# Patient Record
Sex: MALE | Race: OTHER | URBAN - METROPOLITAN AREA
[De-identification: names, ages, dates, MRNs, and addresses within clinical notes are randomized per-mention and may not be internally consistent; named-entity substitution may affect disease eponyms.]

---

## 2020-01-18 ENCOUNTER — INPATIENT (INPATIENT)
Facility: HOSPITAL | Age: 50
LOS: 2 days | Discharge: HOME | End: 2020-01-21
Attending: INTERNAL MEDICINE | Admitting: INTERNAL MEDICINE
Payer: COMMERCIAL

## 2020-01-18 VITALS
WEIGHT: 190.92 LBS | RESPIRATION RATE: 20 BRPM | TEMPERATURE: 99 F | OXYGEN SATURATION: 99 % | SYSTOLIC BLOOD PRESSURE: 128 MMHG | DIASTOLIC BLOOD PRESSURE: 81 MMHG | HEIGHT: 68 IN | HEART RATE: 102 BPM

## 2020-01-18 LAB
ANION GAP SERPL CALC-SCNC: 12 MMOL/L — SIGNIFICANT CHANGE UP (ref 7–14)
BASOPHILS # BLD AUTO: 0.07 K/UL — SIGNIFICANT CHANGE UP (ref 0–0.2)
BASOPHILS NFR BLD AUTO: 0.5 % — SIGNIFICANT CHANGE UP (ref 0–1)
BUN SERPL-MCNC: 17 MG/DL — SIGNIFICANT CHANGE UP (ref 10–20)
CALCIUM SERPL-MCNC: 9.6 MG/DL — SIGNIFICANT CHANGE UP (ref 8.5–10.1)
CHLORIDE SERPL-SCNC: 102 MMOL/L — SIGNIFICANT CHANGE UP (ref 98–110)
CO2 SERPL-SCNC: 24 MMOL/L — SIGNIFICANT CHANGE UP (ref 17–32)
CREAT SERPL-MCNC: 0.9 MG/DL — SIGNIFICANT CHANGE UP (ref 0.7–1.5)
EOSINOPHIL # BLD AUTO: 0.21 K/UL — SIGNIFICANT CHANGE UP (ref 0–0.7)
EOSINOPHIL NFR BLD AUTO: 1.6 % — SIGNIFICANT CHANGE UP (ref 0–8)
GLUCOSE SERPL-MCNC: 98 MG/DL — SIGNIFICANT CHANGE UP (ref 70–99)
HCT VFR BLD CALC: 41.7 % — LOW (ref 42–52)
HGB BLD-MCNC: 14.4 G/DL — SIGNIFICANT CHANGE UP (ref 14–18)
IMM GRANULOCYTES NFR BLD AUTO: 0.4 % — HIGH (ref 0.1–0.3)
LACTATE SERPL-SCNC: 0.6 MMOL/L — LOW (ref 0.7–2)
LYMPHOCYTES # BLD AUTO: 25.5 % — SIGNIFICANT CHANGE UP (ref 20.5–51.1)
LYMPHOCYTES # BLD AUTO: 3.31 K/UL — SIGNIFICANT CHANGE UP (ref 1.2–3.4)
MCHC RBC-ENTMCNC: 32.1 PG — HIGH (ref 27–31)
MCHC RBC-ENTMCNC: 34.5 G/DL — SIGNIFICANT CHANGE UP (ref 32–37)
MCV RBC AUTO: 93.1 FL — SIGNIFICANT CHANGE UP (ref 80–94)
MONOCYTES # BLD AUTO: 0.99 K/UL — HIGH (ref 0.1–0.6)
MONOCYTES NFR BLD AUTO: 7.6 % — SIGNIFICANT CHANGE UP (ref 1.7–9.3)
NEUTROPHILS # BLD AUTO: 8.36 K/UL — HIGH (ref 1.4–6.5)
NEUTROPHILS NFR BLD AUTO: 64.4 % — SIGNIFICANT CHANGE UP (ref 42.2–75.2)
NRBC # BLD: 0 /100 WBCS — SIGNIFICANT CHANGE UP (ref 0–0)
PLATELET # BLD AUTO: 229 K/UL — SIGNIFICANT CHANGE UP (ref 130–400)
POTASSIUM SERPL-MCNC: 3.9 MMOL/L — SIGNIFICANT CHANGE UP (ref 3.5–5)
POTASSIUM SERPL-SCNC: 3.9 MMOL/L — SIGNIFICANT CHANGE UP (ref 3.5–5)
RBC # BLD: 4.48 M/UL — LOW (ref 4.7–6.1)
RBC # FLD: 12.2 % — SIGNIFICANT CHANGE UP (ref 11.5–14.5)
SODIUM SERPL-SCNC: 138 MMOL/L — SIGNIFICANT CHANGE UP (ref 135–146)
WBC # BLD: 12.99 K/UL — HIGH (ref 4.8–10.8)
WBC # FLD AUTO: 12.99 K/UL — HIGH (ref 4.8–10.8)

## 2020-01-18 PROCEDURE — 99285 EMERGENCY DEPT VISIT HI MDM: CPT

## 2020-01-18 RX ORDER — SODIUM CHLORIDE 9 MG/ML
1000 INJECTION INTRAMUSCULAR; INTRAVENOUS; SUBCUTANEOUS ONCE
Refills: 0 | Status: COMPLETED | OUTPATIENT
Start: 2020-01-18 | End: 2020-01-18

## 2020-01-18 RX ADMIN — Medication 100 MILLIGRAM(S): at 22:03

## 2020-01-18 RX ADMIN — SODIUM CHLORIDE 1000 MILLILITER(S): 9 INJECTION INTRAMUSCULAR; INTRAVENOUS; SUBCUTANEOUS at 22:04

## 2020-01-18 NOTE — ED ADULT NURSE NOTE - OBJECTIVE STATEMENT
pt states that he noticed redness and swelling to right lower leg x 2days, pt reports fever at home, denies n/v/d, aches

## 2020-01-19 DIAGNOSIS — R09.89 OTHER SPECIFIED SYMPTOMS AND SIGNS INVOLVING THE CIRCULATORY AND RESPIRATORY SYSTEMS: ICD-10-CM

## 2020-01-19 LAB
ANION GAP SERPL CALC-SCNC: 13 MMOL/L — SIGNIFICANT CHANGE UP (ref 7–14)
BASOPHILS # BLD AUTO: 0.06 K/UL — SIGNIFICANT CHANGE UP (ref 0–0.2)
BASOPHILS NFR BLD AUTO: 0.5 % — SIGNIFICANT CHANGE UP (ref 0–1)
BUN SERPL-MCNC: 12 MG/DL — SIGNIFICANT CHANGE UP (ref 10–20)
CALCIUM SERPL-MCNC: 9.3 MG/DL — SIGNIFICANT CHANGE UP (ref 8.5–10.1)
CHLORIDE SERPL-SCNC: 104 MMOL/L — SIGNIFICANT CHANGE UP (ref 98–110)
CO2 SERPL-SCNC: 23 MMOL/L — SIGNIFICANT CHANGE UP (ref 17–32)
CREAT SERPL-MCNC: 0.9 MG/DL — SIGNIFICANT CHANGE UP (ref 0.7–1.5)
EOSINOPHIL # BLD AUTO: 0.23 K/UL — SIGNIFICANT CHANGE UP (ref 0–0.7)
EOSINOPHIL NFR BLD AUTO: 1.9 % — SIGNIFICANT CHANGE UP (ref 0–8)
GLUCOSE SERPL-MCNC: 105 MG/DL — HIGH (ref 70–99)
HCT VFR BLD CALC: 39.7 % — LOW (ref 42–52)
HGB BLD-MCNC: 13.5 G/DL — LOW (ref 14–18)
IMM GRANULOCYTES NFR BLD AUTO: 0.4 % — HIGH (ref 0.1–0.3)
LYMPHOCYTES # BLD AUTO: 19.9 % — LOW (ref 20.5–51.1)
LYMPHOCYTES # BLD AUTO: 2.45 K/UL — SIGNIFICANT CHANGE UP (ref 1.2–3.4)
MCHC RBC-ENTMCNC: 31.5 PG — HIGH (ref 27–31)
MCHC RBC-ENTMCNC: 34 G/DL — SIGNIFICANT CHANGE UP (ref 32–37)
MCV RBC AUTO: 92.5 FL — SIGNIFICANT CHANGE UP (ref 80–94)
MONOCYTES # BLD AUTO: 1 K/UL — HIGH (ref 0.1–0.6)
MONOCYTES NFR BLD AUTO: 8.1 % — SIGNIFICANT CHANGE UP (ref 1.7–9.3)
MRSA PCR RESULT.: NEGATIVE — SIGNIFICANT CHANGE UP
NEUTROPHILS # BLD AUTO: 8.52 K/UL — HIGH (ref 1.4–6.5)
NEUTROPHILS NFR BLD AUTO: 69.2 % — SIGNIFICANT CHANGE UP (ref 42.2–75.2)
NRBC # BLD: 0 /100 WBCS — SIGNIFICANT CHANGE UP (ref 0–0)
PLATELET # BLD AUTO: 208 K/UL — SIGNIFICANT CHANGE UP (ref 130–400)
POTASSIUM SERPL-MCNC: 4.1 MMOL/L — SIGNIFICANT CHANGE UP (ref 3.5–5)
POTASSIUM SERPL-SCNC: 4.1 MMOL/L — SIGNIFICANT CHANGE UP (ref 3.5–5)
RBC # BLD: 4.29 M/UL — LOW (ref 4.7–6.1)
RBC # FLD: 12.5 % — SIGNIFICANT CHANGE UP (ref 11.5–14.5)
SODIUM SERPL-SCNC: 140 MMOL/L — SIGNIFICANT CHANGE UP (ref 135–146)
WBC # BLD: 12.31 K/UL — HIGH (ref 4.8–10.8)
WBC # FLD AUTO: 12.31 K/UL — HIGH (ref 4.8–10.8)

## 2020-01-19 PROCEDURE — 76882 US LMTD JT/FCL EVL NVASC XTR: CPT | Mod: 26,RT

## 2020-01-19 PROCEDURE — 93971 EXTREMITY STUDY: CPT | Mod: 26,RT

## 2020-01-19 RX ORDER — CEFTRIAXONE 500 MG/1
1000 INJECTION, POWDER, FOR SOLUTION INTRAMUSCULAR; INTRAVENOUS EVERY 12 HOURS
Refills: 0 | Status: DISCONTINUED | OUTPATIENT
Start: 2020-01-19 | End: 2020-01-20

## 2020-01-19 RX ORDER — ACETAMINOPHEN 500 MG
650 TABLET ORAL EVERY 6 HOURS
Refills: 0 | Status: DISCONTINUED | OUTPATIENT
Start: 2020-01-19 | End: 2020-01-21

## 2020-01-19 RX ORDER — CHLORHEXIDINE GLUCONATE 213 G/1000ML
1 SOLUTION TOPICAL
Refills: 0 | Status: DISCONTINUED | OUTPATIENT
Start: 2020-01-19 | End: 2020-01-21

## 2020-01-19 RX ORDER — VANCOMYCIN HCL 1 G
1000 VIAL (EA) INTRAVENOUS EVERY 12 HOURS
Refills: 0 | Status: DISCONTINUED | OUTPATIENT
Start: 2020-01-19 | End: 2020-01-21

## 2020-01-19 RX ORDER — ENOXAPARIN SODIUM 100 MG/ML
40 INJECTION SUBCUTANEOUS DAILY
Refills: 0 | Status: DISCONTINUED | OUTPATIENT
Start: 2020-01-19 | End: 2020-01-21

## 2020-01-19 RX ADMIN — CEFTRIAXONE 100 MILLIGRAM(S): 500 INJECTION, POWDER, FOR SOLUTION INTRAMUSCULAR; INTRAVENOUS at 17:44

## 2020-01-19 RX ADMIN — Medication 250 MILLIGRAM(S): at 17:44

## 2020-01-19 RX ADMIN — Medication 250 MILLIGRAM(S): at 05:31

## 2020-01-19 RX ADMIN — ENOXAPARIN SODIUM 40 MILLIGRAM(S): 100 INJECTION SUBCUTANEOUS at 11:59

## 2020-01-19 RX ADMIN — CEFTRIAXONE 100 MILLIGRAM(S): 500 INJECTION, POWDER, FOR SOLUTION INTRAMUSCULAR; INTRAVENOUS at 05:31

## 2020-01-19 NOTE — H&P ADULT - HISTORY OF PRESENT ILLNESS
50y/o M with no PMH presents with swelling in right lower leg for 4d. Started with some discomfort on R lateral leg, which persisted then area became erythematous and swollen. Went to PMD Dr. Peck who prescribed keflex 500 q6h which he took for 2d. The erythema/swelling continued to worsen and spread to ankle/foot. He also developed some pain in R thigh/groin. He recalled his PCP who told him to come to ED. Endorses fever (measured T105 at home?). Otherwise     In ED: T99.1, HR 77, /69, RR20, SpO2 99%. Labs showed WBC 13 w/o neutrophilic predominance, labs otherwise unremarkable. Pt given clinda, doxy and IVF 1L. 50y/o M with no PMH presents with swelling in right lower leg for 4d. Started with some discomfort on R lateral leg, which persisted then area became erythematous and swollen. Went to PMD Dr. Peck who prescribed keflex 500 q6h which he took for 2d. The erythema/swelling continued to worsen and his ankle/foot became swollen. He also developed some pain in R thigh/groin but no streaking redness along reg. He recalled his PCP who told him to come to ED. Endorses fever, diaphoresis/rigors last night (measured T103 at home). Otherwise denies CP, SOB, palpitations, lightheadness, abd pain, N/V/D/C. No recent flights but works as a .    In ED: T99.1, HR 77, /69, RR20, SpO2 99%. Labs showed WBC 13 w/o neutrophilic predominance, labs otherwise unremarkable. Pt given clinda, doxy and IVF 1L.

## 2020-01-19 NOTE — H&P ADULT - ATTENDING COMMENTS
Patient seen and examined at bedside, agree with above. RLE cellulitis: IV antibiotics as ordered. Blood cultures and sensitivity. Venous duplex. RLE sonogram R/O abscess. ID consult Dr. Patel. Prognosis guarded.

## 2020-01-19 NOTE — ED PROVIDER NOTE - OBJECTIVE STATEMENT
Patient states that on Wednesday started having discomfort on Rt lateral leg, at that time, which continued, next day noted small area of redness with swelling, so went to the PMD, who gave him keflex 500mgs 4 times a day, patient took 8 doses of keflex, stated that redness and swelling continued to get worse, and also noted swelling of the leg, around the ankle area and the foot, started feeling pain in the Rt proximal thigh and groin area, called Dr. Peck, was told to go to ED if gets worse, so had come to ED. Patient also states that yesterday had fever, checked temp and it was 105F, today did not feel the fever, denies any other symptoms.

## 2020-01-19 NOTE — H&P ADULT - NSHPPHYSICALEXAM_GEN_ALL_CORE
PHYSICAL EXAM:  GENERAL: NAD, well-developed  HEENT:  Atraumatic, Normocephalic; EOMI, PERRLA, conjunctiva pink, sclera white, Supple, No JVD, thyromegally or LYAD appreciated  CHEST/LUNG: Clear to auscultation bilaterally; No wheeze, ronchi or rales  HEART: Regular rate and rhythm; No murmurs, rubs, or gallops  ABDOMEN: Soft, Nontender, Nondistended; Bowel sounds present  EXTREMITIES:  2+ Peripheral Pulses, No peripheral pitting edema  PSYCH: AAOx3  NEUROLOGY: non-focal  SKIN: No rashes/lesions appreciated PHYSICAL EXAM:  GENERAL: NAD, well-developed M sleeping comfortably   HEENT:  Atraumatic, Normocephalic; EOMI, PERRLA, conjunctiva pink, sclera white, Supple, No LYAD in cervical chain appreciated  CHEST/LUNG: Clear to auscultation bilaterally; No wheeze  HEART: Regular rate and rhythm; No murmurs  ABDOMEN: Soft, Nontender, Nondistended; Bowel sounds present  EXTREMITIES:  R lower leg lateral 8" area of erythema, with small punctate center, unable to express pus, indurated, no discrete area of fluctuance, ankle/leg warm and swollen with intact pulses/light sensation. No streaking, no appreciable lymphadenopathy in femoral area, no calf tenderness bilat  PSYCH: AAOx3  NEUROLOGY: non-focal  SKIN: as above

## 2020-01-19 NOTE — H&P ADULT - ASSESSMENT
48y/o M with no PMH presents with purulent R leg cellulitis.     #R leg cellulitis with some purulence  - WBC 13 on admit, fever at home not in hospital  - f/u BCx  - f/u duplex  - c/w     #MISC  - DVT ppx: lovenox  - GI ppx: not indicated  - Activity: as tolerated  - Diet: regular 50y/o M with no PMH presents with purulent R leg cellulitis.     #R leg cellulitis with some purulence; very unlikely underlying DVT  - WBC 13 on admit, fever at home not in hospital; monitor area for change  - f/u BCx  - f/u duplex   - f/u US of the area (looking for abscess, will need I&D if there is discrete collection)  - start vanc, ceftriaxone   - consider ID if not improving    #MISC  - DVT ppx: lovenox  - GI ppx: not indicated  - Activity: as tolerated  - Diet: regular

## 2020-01-19 NOTE — ED PROVIDER NOTE - PHYSICAL EXAMINATION
On side to side comparison with LLE--   RLE has soft tissue swelling of the leg/around the ankles/dorsum of the foot, on Rt lateral leg has small punctum with surrounding swelling/induration with tenderness, no fluctuation noted, no crepitus, full ROM of the knee/ankle/foot noted, no lymphangitis, no inguinal Lymphadenopathy. RLE is distally NVI.

## 2020-01-19 NOTE — H&P ADULT - NSHPLABSRESULTS_GEN_ALL_CORE
13.5   12.31 )-----------( 208      ( 19 Jan 2020 06:15 )             39.7   01-19    140  |  104  |  12  ----------------------------<  105<H>  4.1   |  23  |  0.9    Ca    9.3      19 Jan 2020 06:15

## 2020-01-20 LAB
ANION GAP SERPL CALC-SCNC: 11 MMOL/L — SIGNIFICANT CHANGE UP (ref 7–14)
APTT BLD: 33.6 SEC — SIGNIFICANT CHANGE UP (ref 27–39.2)
BASOPHILS # BLD AUTO: 0.06 K/UL — SIGNIFICANT CHANGE UP (ref 0–0.2)
BASOPHILS NFR BLD AUTO: 0.5 % — SIGNIFICANT CHANGE UP (ref 0–1)
BUN SERPL-MCNC: 11 MG/DL — SIGNIFICANT CHANGE UP (ref 10–20)
CALCIUM SERPL-MCNC: 9.3 MG/DL — SIGNIFICANT CHANGE UP (ref 8.5–10.1)
CHLORIDE SERPL-SCNC: 103 MMOL/L — SIGNIFICANT CHANGE UP (ref 98–110)
CO2 SERPL-SCNC: 25 MMOL/L — SIGNIFICANT CHANGE UP (ref 17–32)
CREAT SERPL-MCNC: 0.8 MG/DL — SIGNIFICANT CHANGE UP (ref 0.7–1.5)
EOSINOPHIL # BLD AUTO: 0.24 K/UL — SIGNIFICANT CHANGE UP (ref 0–0.7)
EOSINOPHIL NFR BLD AUTO: 2.1 % — SIGNIFICANT CHANGE UP (ref 0–8)
GLUCOSE SERPL-MCNC: 109 MG/DL — HIGH (ref 70–99)
HCT VFR BLD CALC: 40.7 % — LOW (ref 42–52)
HGB BLD-MCNC: 14 G/DL — SIGNIFICANT CHANGE UP (ref 14–18)
IMM GRANULOCYTES NFR BLD AUTO: 0.4 % — HIGH (ref 0.1–0.3)
INR BLD: 1.1 RATIO — SIGNIFICANT CHANGE UP (ref 0.65–1.3)
LYMPHOCYTES # BLD AUTO: 2.52 K/UL — SIGNIFICANT CHANGE UP (ref 1.2–3.4)
LYMPHOCYTES # BLD AUTO: 22.5 % — SIGNIFICANT CHANGE UP (ref 20.5–51.1)
MAGNESIUM SERPL-MCNC: 2 MG/DL — SIGNIFICANT CHANGE UP (ref 1.8–2.4)
MCHC RBC-ENTMCNC: 31.7 PG — HIGH (ref 27–31)
MCHC RBC-ENTMCNC: 34.4 G/DL — SIGNIFICANT CHANGE UP (ref 32–37)
MCV RBC AUTO: 92.1 FL — SIGNIFICANT CHANGE UP (ref 80–94)
MONOCYTES # BLD AUTO: 0.8 K/UL — HIGH (ref 0.1–0.6)
MONOCYTES NFR BLD AUTO: 7.1 % — SIGNIFICANT CHANGE UP (ref 1.7–9.3)
NEUTROPHILS # BLD AUTO: 7.55 K/UL — HIGH (ref 1.4–6.5)
NEUTROPHILS NFR BLD AUTO: 67.4 % — SIGNIFICANT CHANGE UP (ref 42.2–75.2)
NRBC # BLD: 0 /100 WBCS — SIGNIFICANT CHANGE UP (ref 0–0)
PLATELET # BLD AUTO: 227 K/UL — SIGNIFICANT CHANGE UP (ref 130–400)
POTASSIUM SERPL-MCNC: 3.6 MMOL/L — SIGNIFICANT CHANGE UP (ref 3.5–5)
POTASSIUM SERPL-SCNC: 3.6 MMOL/L — SIGNIFICANT CHANGE UP (ref 3.5–5)
PROTHROM AB SERPL-ACNC: 12.6 SEC — SIGNIFICANT CHANGE UP (ref 9.95–12.87)
RBC # BLD: 4.42 M/UL — LOW (ref 4.7–6.1)
RBC # FLD: 12.2 % — SIGNIFICANT CHANGE UP (ref 11.5–14.5)
SODIUM SERPL-SCNC: 139 MMOL/L — SIGNIFICANT CHANGE UP (ref 135–146)
WBC # BLD: 11.22 K/UL — HIGH (ref 4.8–10.8)
WBC # FLD AUTO: 11.22 K/UL — HIGH (ref 4.8–10.8)

## 2020-01-20 PROCEDURE — 11043 DBRDMT MUSC&/FSCA 1ST 20/<: CPT

## 2020-01-20 PROCEDURE — 99231 SBSQ HOSP IP/OBS SF/LOW 25: CPT | Mod: 25

## 2020-01-20 PROCEDURE — 11046 DBRDMT MUSC&/FSCA EA ADDL: CPT

## 2020-01-20 RX ORDER — MORPHINE SULFATE 50 MG/1
2 CAPSULE, EXTENDED RELEASE ORAL EVERY 6 HOURS
Refills: 0 | Status: DISCONTINUED | OUTPATIENT
Start: 2020-01-20 | End: 2020-01-20

## 2020-01-20 RX ORDER — MORPHINE SULFATE 50 MG/1
2 CAPSULE, EXTENDED RELEASE ORAL ONCE
Refills: 0 | Status: DISCONTINUED | OUTPATIENT
Start: 2020-01-20 | End: 2020-01-20

## 2020-01-20 RX ADMIN — Medication 250 MILLIGRAM(S): at 05:29

## 2020-01-20 RX ADMIN — Medication 250 MILLIGRAM(S): at 17:47

## 2020-01-20 RX ADMIN — MORPHINE SULFATE 2 MILLIGRAM(S): 50 CAPSULE, EXTENDED RELEASE ORAL at 18:05

## 2020-01-20 RX ADMIN — MORPHINE SULFATE 2 MILLIGRAM(S): 50 CAPSULE, EXTENDED RELEASE ORAL at 14:08

## 2020-01-20 RX ADMIN — MORPHINE SULFATE 2 MILLIGRAM(S): 50 CAPSULE, EXTENDED RELEASE ORAL at 18:20

## 2020-01-20 RX ADMIN — ENOXAPARIN SODIUM 40 MILLIGRAM(S): 100 INJECTION SUBCUTANEOUS at 12:19

## 2020-01-20 RX ADMIN — CEFTRIAXONE 100 MILLIGRAM(S): 500 INJECTION, POWDER, FOR SOLUTION INTRAMUSCULAR; INTRAVENOUS at 05:30

## 2020-01-20 NOTE — CONSULT NOTE ADULT - SUBJECTIVE AND OBJECTIVE BOX
pt with infected right lower lateral leg--. on po/ iv abx    pe; right lower lateral leg--> 10x5cm erythema and tenderness with central tenderness

## 2020-01-20 NOTE — BRIEF OPERATIVE NOTE - NSICDXBRIEFPROCEDURE_GEN_ALL_CORE_FT
PROCEDURES:  Selective debridement 20-Jan-2020 17:10:13 excisoinal debridement right lower leg Sesar Escobar

## 2020-01-20 NOTE — PROGRESS NOTE ADULT - ASSESSMENT
1. RLE cellulitis: IV antibiotics as ordered. Elevate extremity. Warm compresses. ID consult. Follow up blood cultures and sensitivity. Transition to PO antibiotics when deemed appropriate by ID. DVT prophylaxis

## 2020-01-20 NOTE — PROGRESS NOTE ADULT - SUBJECTIVE AND OBJECTIVE BOX
JOVANNY STONER 49y Male  MRN#: [id number]   CODE STATUS: FULL      SUBJECTIVE  Patient is a 49y old Male who presents with a chief complaint of right leg swelling (20 Jan 2020 10:10)    Currently admitted to medicine with the primary diagnosis of RLE celluliltis    Today is hospital day 1d,   OVERNIGHT EVENTS: none, patient does not have any LE pain , feels better, ambulating well.    This morning he is laying in bed comfortably .     Urinating and stooling appropriately.    Present Today:           Bodren Catheter (x)No/ ()Yes?   Indication:             Central Line (x)No/ ()Yes?   Indication:          IV Fluids (x)No/ ()Yes? Type:  Rate:  Indication:    OBJECTIVE  PAST MEDICAL & SURGICAL HISTORY  No pertinent past medical history  No significant past surgical history    ALLERGIES:  No Known Allergies    HOME MEDICATIONS:  Home Medications:    MEDICATIONS:  STANDING MEDICATIONS  chlorhexidine 4% Liquid 1 Application(s) Topical <User Schedule>  enoxaparin Injectable 40 milliGRAM(s) SubCutaneous daily  vancomycin  IVPB 1000 milliGRAM(s) IV Intermittent every 12 hours    PRN MEDICATIONS  acetaminophen   Tablet .. 650 milliGRAM(s) Oral every 6 hours PRN      VITAL SIGNS: Last 24 Hours  T(C): 35.9 (20 Jan 2020 05:55), Max: 36.7 (19 Jan 2020 21:06)  T(F): 96.7 (20 Jan 2020 05:55), Max: 98.1 (19 Jan 2020 21:06)  HR: 68 (20 Jan 2020 05:55) (68 - 88)  BP: 111/68 (20 Jan 2020 05:55) (111/68 - 125/77)  RR: 18 (20 Jan 2020 05:55) (18 - 18)  SpO2: 99% (19 Jan 2020 13:58) (99% - 99%)    LABS:                        14.0   11.22 )-----------( 227      ( 20 Jan 2020 07:39 )             40.7     01-20    139  |  103  |  11  ----------------------------<  109<H>  3.6   |  25  |  0.8    Ca    9.3      20 Jan 2020 07:39  Mg     2.0     01-20      PT/INR - ( 20 Jan 2020 07:39 )   PT: 12.60 sec;   INR: 1.10 ratio         PTT - ( 20 Jan 2020 07:39 )  PTT:33.6 sec  Culture - Blood (collected 18 Jan 2020 21:24)  Source: .Blood Blood-Venous  Preliminary Report (20 Jan 2020 04:04):    No growth to date.    Culture - Blood (collected 18 Jan 2020 21:24)  Source: .Blood Blood-Venous  Preliminary Report (20 Jan 2020 04:03):    No growth to date.      RADIOLOGY:     VA Duplex Lower Ext Vein Scan, Right (01.19.20 @ 10:05) >  No evidence of deep venous thrombosis or superficial thrombophlebitis in the right lower extremity    US Extremity Nonvasc Limited, Right (01.19.20 @ 02:51) >  Subcutaneous edema.     No sonographic evidence of abscess or fluid collection.    ECHO:  none in this admission    PHYSICAL EXAM:    GENERAL: NAD, well-developed, AAOx3  HEENT:  Atraumatic, Normocephalic. EOMI, PERRLA, conjunctiva and sclera clear, No JVD  PULMONARY: Clear to auscultation bilaterally; No wheeze  CARDIOVASCULAR: Regular rate and rhythm; No murmurs, rubs, or gallops  GASTROINTESTINAL: Soft, Nontender, Nondistended; Bowel sounds present  MUSCULOSKELETAL:  2+ Peripheral Pulses, No clubbing, cyanosis  EXTREMITIES: right lower lateral leg--> 10x5cm erythema and tenderness with central tenderness  NEUROLOGY: non-focal  SKIN: No rashes or lesions    ASSESSMENT & PLAN  50y/o M with no PMH presents with  R leg cellulitis.     #R leg cellulitis with some purulence; very unlikely underlying DVT  - WBC 12.9>>11.22  afebrile during the hospital stage so far  -  BCx 1/18: NG, MRSA nares negative  - VA duplex -ve DVT  - US of rLE: subq edema , no abcess /fluid collection but fluctuance present on examination  - As per ID , c/w vancomycin , d/c rocephin  - burn consulted , I&D at bedside today    #MISC  - DVT ppx: lovenox  - GI ppx: not indicated  - Activity: as tolerated  - Diet: regular  - CODE: FULL  - Disposition: from home  - Pending: Burn I&D

## 2020-01-20 NOTE — CONSULT NOTE ADULT - SUBJECTIVE AND OBJECTIVE BOX
JOVANNY STONER  49y, Male  Allergy: No Known Allergies      CHIEF COMPLAINT: right leg swelling (20 Jan 2020 07:34)      HPI:  48y/o M with no PMH presents with swelling in right lower leg for 4d. Started with some discomfort on R lateral leg, which persisted then area became erythematous and swollen. Went to PMD Dr. Peck who prescribed keflex 500 q6h which he took for 2d. The erythema/swelling continued to worsen and his ankle/foot became swollen. He also developed some pain in R thigh/groin but no streaking redness along reg. He recalled his PCP who told him to come to ED. Endorses fever, diaphoresis/rigors last night (measured T103 at home). Otherwise denies CP, SOB, palpitations, lightheadness, abd pain, N/V/D/C. No recent flights but works as a .    In ED: T99.1, HR 77, /69, RR20, SpO2 99%. Labs showed WBC 13 w/o neutrophilic predominance, labs otherwise unremarkable. Pt given clinda, doxy and IVF 1L. (19 Jan 2020 01:43)      PAST MEDICAL & SURGICAL HISTORY:  No pertinent past medical history  No significant past surgical history      FAMILY HISTORY  No pertinent family history in first degree relatives      SOCIAL HISTORY  Social History (marital status, living situation, occupation, tobacco use, alcohol and drug use, and sexual history): social EtOH, no drug/cig.      ROS  General: Denies rigors, nightsweats  HEENT: Denies headache, rhinorrhea, sore throat, eye pain  CV: Denies CP, palpitations  PULM: Denies wheezing, hemoptysis  GI: Denies hematemesis, hematochezia, melena  : Denies discharge, hematuria  MSK: Denies arthralgias, myalgias  SKIN: as noted above   NEURO: Denies paresthesias, weakness  PSYCH: Denies depression, anxiety    VITALS:  T(F): 96.7, Max: 98.1 (01-19-20 @ 21:06)  HR: 68  BP: 111/68  RR: 18Vital Signs Last 24 Hrs  T(C): 35.9 (20 Jan 2020 05:55), Max: 36.7 (19 Jan 2020 21:06)  T(F): 96.7 (20 Jan 2020 05:55), Max: 98.1 (19 Jan 2020 21:06)  HR: 68 (20 Jan 2020 05:55) (68 - 88)  BP: 111/68 (20 Jan 2020 05:55) (111/68 - 125/77)  BP(mean): --  RR: 18 (20 Jan 2020 05:55) (18 - 18)  SpO2: 99% (19 Jan 2020 13:58) (99% - 99%)    PHYSICAL EXAM:  Gen: NAD, resting in bed  HEENT: Normocephalic, atraumatic  Neck: supple, no lymphadenopathy  CV: Regular rate & regular rhythm  Lungs: decreased BS at bases, no fremitus  Abdomen: Soft, BS present  Ext: Warm, well perfused, RLE erythema  Neuro: non focal, awake  Skin: no rash, no erythema  Lines: no phlebitis    TESTS & MEASUREMENTS:                        14.0   11.22 )-----------( 227      ( 20 Jan 2020 07:39 )             40.7     01-20    139  |  103  |  11  ----------------------------<  109<H>  3.6   |  25  |  0.8    Ca    9.3      20 Jan 2020 07:39  Mg     2.0     01-20      eGFR if Non African American: 105 mL/min/1.73M2 (01-20-20 @ 07:39)  eGFR if African American: 122 mL/min/1.73M2 (01-20-20 @ 07:39)          Culture - Blood (collected 01-18-20 @ 21:24)  Source: .Blood Blood-Venous  Preliminary Report (01-20-20 @ 04:04):    No growth to date.    Culture - Blood (collected 01-18-20 @ 21:24)  Source: .Blood Blood-Venous  Preliminary Report (01-20-20 @ 04:03):    No growth to date.        Lactate, Blood: 0.6 mmol/L (01-18-20 @ 21:24)      INFECTIOUS DISEASES TESTING  MRSA PCR Result.: Negative (01-19-20 @ 06:00)      RADIOLOGY & ADDITIONAL TESTS:  I have personally reviewed the last Chest xray  CXR      CT      CARDIOLOGY TESTING      MEDICATIONS  cefTRIAXone   IVPB 1000  chlorhexidine 4% Liquid 1  enoxaparin Injectable 40  vancomycin  IVPB 1000      ANTIBIOTICS:  cefTRIAXone   IVPB 1000 milliGRAM(s) IV Intermittent every 12 hours  vancomycin  IVPB 1000 milliGRAM(s) IV Intermittent every 12 hours      ALLERGIES:  No Known Allergies JOVANNY STONER  49y, Male  Allergy: No Known Allergies      CHIEF COMPLAINT: right leg swelling (20 Jan 2020 07:34)      HPI:  50y/o M with no PMH presents with swelling in right lower leg for 4d. Started with some discomfort on R lateral leg, which persisted then area became erythematous and swollen. Went to PMD Dr. Peck who prescribed keflex 500 q6h which he took for 2d. The erythema/swelling continued to worsen and his ankle/foot became swollen. He also developed some pain in R thigh/groin but no streaking redness along reg. He recalled his PCP who told him to come to ED. Endorses fever, diaphoresis/rigors last night (measured T103 at home). Otherwise denies CP, SOB, palpitations, lightheadness, abd pain, N/V/D/C. No recent flights but works as a .    In ED: T99.1, HR 77, /69, RR20, SpO2 99%. Labs showed WBC 13 w/o neutrophilic predominance, labs otherwise unremarkable. Pt given clinda, doxy and IVF 1L. (19 Jan 2020 01:43)      PAST MEDICAL & SURGICAL HISTORY:  No pertinent past medical history  No significant past surgical history      FAMILY HISTORY  No pertinent family history in first degree relatives      SOCIAL HISTORY  Social History (marital status, living situation, occupation, tobacco use, alcohol and drug use, and sexual history): social EtOH, no drug/cig.      ROS  General: Denies rigors, nightsweats  HEENT: Denies headache, rhinorrhea, sore throat, eye pain  CV: Denies CP, palpitations  PULM: Denies wheezing, hemoptysis  GI: Denies hematemesis, hematochezia, melena  : Denies discharge, hematuria  MSK: Denies arthralgias, myalgias  SKIN: as noted above   NEURO: Denies paresthesias, weakness  PSYCH: Denies depression, anxiety    VITALS:  T(F): 96.7, Max: 98.1 (01-19-20 @ 21:06)  HR: 68  BP: 111/68  RR: 18Vital Signs Last 24 Hrs  T(C): 35.9 (20 Jan 2020 05:55), Max: 36.7 (19 Jan 2020 21:06)  T(F): 96.7 (20 Jan 2020 05:55), Max: 98.1 (19 Jan 2020 21:06)  HR: 68 (20 Jan 2020 05:55) (68 - 88)  BP: 111/68 (20 Jan 2020 05:55) (111/68 - 125/77)  BP(mean): --  RR: 18 (20 Jan 2020 05:55) (18 - 18)  SpO2: 99% (19 Jan 2020 13:58) (99% - 99%)    PHYSICAL EXAM:  Gen: NAD, resting in bed  HEENT: Normocephalic, atraumatic  Neck: supple, no lymphadenopathy  CV: Regular rate & regular rhythm  Lungs: decreased BS at bases, no fremitus  Abdomen: Soft, BS present  Ext: Warm, well perfused, RLE erythema/fluctuance  Neuro: non focal, awake  Skin: no rash, no erythema  Lines: no phlebitis    TESTS & MEASUREMENTS:                        14.0   11.22 )-----------( 227      ( 20 Jan 2020 07:39 )             40.7     01-20    139  |  103  |  11  ----------------------------<  109<H>  3.6   |  25  |  0.8    Ca    9.3      20 Jan 2020 07:39  Mg     2.0     01-20      eGFR if Non African American: 105 mL/min/1.73M2 (01-20-20 @ 07:39)  eGFR if African American: 122 mL/min/1.73M2 (01-20-20 @ 07:39)          Culture - Blood (collected 01-18-20 @ 21:24)  Source: .Blood Blood-Venous  Preliminary Report (01-20-20 @ 04:04):    No growth to date.    Culture - Blood (collected 01-18-20 @ 21:24)  Source: .Blood Blood-Venous  Preliminary Report (01-20-20 @ 04:03):    No growth to date.        Lactate, Blood: 0.6 mmol/L (01-18-20 @ 21:24)      INFECTIOUS DISEASES TESTING  MRSA PCR Result.: Negative (01-19-20 @ 06:00)      RADIOLOGY & ADDITIONAL TESTS:  I have personally reviewed the last Chest xray  CXR      CT      CARDIOLOGY TESTING      MEDICATIONS  cefTRIAXone   IVPB 1000  chlorhexidine 4% Liquid 1  enoxaparin Injectable 40  vancomycin  IVPB 1000      ANTIBIOTICS:  cefTRIAXone   IVPB 1000 milliGRAM(s) IV Intermittent every 12 hours  vancomycin  IVPB 1000 milliGRAM(s) IV Intermittent every 12 hours      ALLERGIES:  No Known Allergies

## 2020-01-20 NOTE — PROGRESS NOTE ADULT - SUBJECTIVE AND OBJECTIVE BOX
JOVANNY STONER  49y  Male      Patient is a 49y old  Male who presents with a chief complaint of right leg swelling (19 Jan 2020 01:43)      INTERVAL HPI/OVERNIGHT EVENTS: Feeling better      REVIEW OF SYSTEMS:  CONSTITUTIONAL: No fever, weight loss, or fatigue  EYES: No eye pain, visual disturbances, or discharge  ENMT:  No difficulty hearing, tinnitus, vertigo; No sinus or throat pain  NECK: No pain or stiffness  BREASTS: No pain, masses, or nipple discharge  RESPIRATORY: No cough, wheezing, chills or hemoptysis; No shortness of breath  CARDIOVASCULAR: No chest pain, palpitations, dizziness, or leg swelling  GASTROINTESTINAL: No abdominal or epigastric pain. No nausea, vomiting, or hematemesis; No diarrhea or constipation. No melena or hematochezia.  GENITOURINARY: No dysuria, frequency, hematuria, or incontinence  NEUROLOGICAL: No headaches, memory loss, loss of strength, numbness, or tremors  SKIN: No itching, burning, rashes, or lesions   LYMPH NODES: No enlarged glands  ENDOCRINE: No heat or cold intolerance; No hair loss  MUSCULOSKELETAL: No joint pain or swelling; No muscle or back pain. RLE pain improved  PSYCHIATRIC: No depression, anxiety, mood swings, or difficulty sleeping  HEME/LYMPH: No easy bruising, or bleeding gums  ALLERY AND IMMUNOLOGIC: No hives or eczema    T(C): 35.9 (01-20-20 @ 05:55), Max: 36.7 (01-19-20 @ 21:06)  HR: 68 (01-20-20 @ 05:55) (68 - 88)  BP: 111/68 (01-20-20 @ 05:55) (111/68 - 125/77)  RR: 18 (01-20-20 @ 05:55) (18 - 18)  SpO2: 99% (01-19-20 @ 13:58) (98% - 99%)  Wt(kg): --Vital Signs Last 24 Hrs  T(C): 35.9 (20 Jan 2020 05:55), Max: 36.7 (19 Jan 2020 21:06)  T(F): 96.7 (20 Jan 2020 05:55), Max: 98.1 (19 Jan 2020 21:06)  HR: 68 (20 Jan 2020 05:55) (68 - 88)  BP: 111/68 (20 Jan 2020 05:55) (111/68 - 125/77)  BP(mean): --  RR: 18 (20 Jan 2020 05:55) (18 - 18)  SpO2: 99% (19 Jan 2020 13:58) (98% - 99%)    PHYSICAL EXAM:  GENERAL: NAD, well-groomed, well-developed  HEAD:  Atraumatic, Normocephalic  EYES: EOMI, PERRLA, conjunctiva and sclera clear  ENMT: No tonsillar erythema, exudates, or enlargement; Moist mucous membranes, Good dentition, No lesions  NECK: Supple, No JVD, Normal thyroid  NERVOUS SYSTEM:  Alert & Oriented X3, Good concentration; Motor Strength 5/5 B/L upper and lower extremities; DTRs 2+ intact and symmetric  CHEST/LUNG: Clear to percussion bilaterally; No rales, rhonchi, wheezing, or rubs  HEART: Regular rate and rhythm; No murmurs, rubs, or gallops  ABDOMEN: Soft, Nontender, Nondistended; Bowel sounds present  EXTREMITIES:  2+ Peripheral Pulses, No clubbing, cyanosis, or edema. Swelling/erythema/tenderness lateral aspect upper third RLE improving  LYMPH: No lymphadenopathy noted  SKIN: No rashes or lesions    Consultant(s) Notes Reviewed:  [x ] YES  [ ] NO    Discussed with Consultants/Other Providers [ x] YES     LABS                         13.5   12.31 )-----------( 208      ( 19 Jan 2020 06:15 )             39.7   01-19    140  |  104  |  12  ----------------------------<  105<H>  4.1   |  23  |  0.9    Ca    9.3      19 Jan 2020 06:15          RADIOLOGY & ADDITIONAL TESTS:    Imaging Personally Reviewed:  [ ] YES  [ ] NO    HEALTH ISSUES - PROBLEM Dx:  MEDICATIONS  (STANDING):  cefTRIAXone   IVPB 1000 milliGRAM(s) IV Intermittent every 12 hours  chlorhexidine 4% Liquid 1 Application(s) Topical <User Schedule>  enoxaparin Injectable 40 milliGRAM(s) SubCutaneous daily  vancomycin  IVPB 1000 milliGRAM(s) IV Intermittent every 12 hours    MEDICATIONS  (PRN):  acetaminophen   Tablet .. 650 milliGRAM(s) Oral every 6 hours PRN Mild Pain (1 - 3)  Suspected deep vein thrombosis (DVT)

## 2020-01-20 NOTE — CONSULT NOTE ADULT - CONSULT REASON
Pt informed.  Rx approved by M.D. and called to pharmacy.  U/A ordered, pt informed to call the [facility] for result  Pt requested Vit D test as completed RX Vit D, ordered   cellulitis

## 2020-01-20 NOTE — CONSULT NOTE ADULT - ASSESSMENT
ASSESSMENT  50y/o M with no PMH presents with swelling in right lower leg for 4d, took 2 days of OP keflex    IMPRESSION  #RLE cellulitis    US no DVT< no abscess    BCX NG    Sepsis ruled out on admission WBC 12    RECOMMENDATIONS  - This is an incomplete pended note, all final recommendations to follow.     Spectra [id number] ASSESSMENT  50y/o M with no PMH presents with swelling in right lower leg for 4d, took 2 days of OP keflex    IMPRESSION  #RLE absecess    US no DVT< no abscess    BCX NG    Sepsis ruled out on admission WBC 12    RECOMMENDATIONS  - Burn consult appreciated for I&D  - Cont vancomycin  IVPB 1000 milliGRAM(s) IV Intermittent every 12 hours  - d/c ceftriaxone  - likely d/c on PO bactrim after I&D for MRSA coverage, f/u WCX    Spectra [id number]

## 2020-01-21 ENCOUNTER — TRANSCRIPTION ENCOUNTER (OUTPATIENT)
Age: 50
End: 2020-01-21

## 2020-01-21 VITALS
RESPIRATION RATE: 18 BRPM | SYSTOLIC BLOOD PRESSURE: 123 MMHG | HEART RATE: 78 BPM | DIASTOLIC BLOOD PRESSURE: 76 MMHG | TEMPERATURE: 97 F

## 2020-01-21 LAB
ALBUMIN SERPL ELPH-MCNC: 3.9 G/DL — SIGNIFICANT CHANGE UP (ref 3.5–5.2)
ALP SERPL-CCNC: 67 U/L — SIGNIFICANT CHANGE UP (ref 30–115)
ALT FLD-CCNC: 27 U/L — SIGNIFICANT CHANGE UP (ref 0–41)
ANION GAP SERPL CALC-SCNC: 12 MMOL/L — SIGNIFICANT CHANGE UP (ref 7–14)
AST SERPL-CCNC: 18 U/L — SIGNIFICANT CHANGE UP (ref 0–41)
BASOPHILS # BLD AUTO: 0.06 K/UL — SIGNIFICANT CHANGE UP (ref 0–0.2)
BASOPHILS NFR BLD AUTO: 0.5 % — SIGNIFICANT CHANGE UP (ref 0–1)
BILIRUB SERPL-MCNC: 0.6 MG/DL — SIGNIFICANT CHANGE UP (ref 0.2–1.2)
BUN SERPL-MCNC: 12 MG/DL — SIGNIFICANT CHANGE UP (ref 10–20)
CALCIUM SERPL-MCNC: 9.3 MG/DL — SIGNIFICANT CHANGE UP (ref 8.5–10.1)
CHLORIDE SERPL-SCNC: 104 MMOL/L — SIGNIFICANT CHANGE UP (ref 98–110)
CO2 SERPL-SCNC: 23 MMOL/L — SIGNIFICANT CHANGE UP (ref 17–32)
CREAT SERPL-MCNC: 0.9 MG/DL — SIGNIFICANT CHANGE UP (ref 0.7–1.5)
EOSINOPHIL # BLD AUTO: 0.25 K/UL — SIGNIFICANT CHANGE UP (ref 0–0.7)
EOSINOPHIL NFR BLD AUTO: 2.2 % — SIGNIFICANT CHANGE UP (ref 0–8)
GLUCOSE SERPL-MCNC: 115 MG/DL — HIGH (ref 70–99)
HCT VFR BLD CALC: 40.6 % — LOW (ref 42–52)
HGB BLD-MCNC: 14 G/DL — SIGNIFICANT CHANGE UP (ref 14–18)
IMM GRANULOCYTES NFR BLD AUTO: 0.3 % — SIGNIFICANT CHANGE UP (ref 0.1–0.3)
LYMPHOCYTES # BLD AUTO: 2.62 K/UL — SIGNIFICANT CHANGE UP (ref 1.2–3.4)
LYMPHOCYTES # BLD AUTO: 23.4 % — SIGNIFICANT CHANGE UP (ref 20.5–51.1)
MAGNESIUM SERPL-MCNC: 1.9 MG/DL — SIGNIFICANT CHANGE UP (ref 1.8–2.4)
MCHC RBC-ENTMCNC: 32.4 PG — HIGH (ref 27–31)
MCHC RBC-ENTMCNC: 34.5 G/DL — SIGNIFICANT CHANGE UP (ref 32–37)
MCV RBC AUTO: 94 FL — SIGNIFICANT CHANGE UP (ref 80–94)
MONOCYTES # BLD AUTO: 1.05 K/UL — HIGH (ref 0.1–0.6)
MONOCYTES NFR BLD AUTO: 9.4 % — HIGH (ref 1.7–9.3)
NEUTROPHILS # BLD AUTO: 7.19 K/UL — HIGH (ref 1.4–6.5)
NEUTROPHILS NFR BLD AUTO: 64.2 % — SIGNIFICANT CHANGE UP (ref 42.2–75.2)
NRBC # BLD: 0 /100 WBCS — SIGNIFICANT CHANGE UP (ref 0–0)
PLATELET # BLD AUTO: 247 K/UL — SIGNIFICANT CHANGE UP (ref 130–400)
POTASSIUM SERPL-MCNC: 3.9 MMOL/L — SIGNIFICANT CHANGE UP (ref 3.5–5)
POTASSIUM SERPL-SCNC: 3.9 MMOL/L — SIGNIFICANT CHANGE UP (ref 3.5–5)
PROT SERPL-MCNC: 7 G/DL — SIGNIFICANT CHANGE UP (ref 6–8)
RBC # BLD: 4.32 M/UL — LOW (ref 4.7–6.1)
RBC # FLD: 12 % — SIGNIFICANT CHANGE UP (ref 11.5–14.5)
SODIUM SERPL-SCNC: 139 MMOL/L — SIGNIFICANT CHANGE UP (ref 135–146)
WBC # BLD: 11.2 K/UL — HIGH (ref 4.8–10.8)
WBC # FLD AUTO: 11.2 K/UL — HIGH (ref 4.8–10.8)

## 2020-01-21 PROCEDURE — 99231 SBSQ HOSP IP/OBS SF/LOW 25: CPT

## 2020-01-21 RX ADMIN — Medication 250 MILLIGRAM(S): at 05:35

## 2020-01-21 RX ADMIN — CHLORHEXIDINE GLUCONATE 1 APPLICATION(S): 213 SOLUTION TOPICAL at 05:35

## 2020-01-21 NOTE — PROGRESS NOTE ADULT - SUBJECTIVE AND OBJECTIVE BOX
JOVANNY STONER 49y Male  MRN#: [id number]   CODE STATUS: FULL      SUBJECTIVE  Patient is a 49y old Male who presents with a chief complaint of right leg swelling (21 Jan 2020 07:44)    Currently admitted to medicine with the primary diagnosis of RLE cellulitis s/p excisional debridement    Today is hospital day 2d,   OVERNIGHT EVENTS: patient had low grade temp yesterday. B Cx were sent.  Had excisional debridement yesterday elvin rose    Patient feeling much better today.     Urinating and stooling appropriately.    Present Today:           Borden Catheter (x)No/ ()Yes?   Indication:             Central Line (x)No/ ()Yes?   Indication:          IV Fluids (x)No/ ()Yes? Type:  Rate:  Indication:    OBJECTIVE  PAST MEDICAL & SURGICAL HISTORY  No pertinent past medical history  No significant past surgical history    ALLERGIES:  No Known Allergies    HOME MEDICATIONS:  Home Medications:    MEDICATIONS:  STANDING MEDICATIONS  chlorhexidine 4% Liquid 1 Application(s) Topical <User Schedule>  enoxaparin Injectable 40 milliGRAM(s) SubCutaneous daily  vancomycin  IVPB 1000 milliGRAM(s) IV Intermittent every 12 hours    PRN MEDICATIONS  acetaminophen   Tablet .. 650 milliGRAM(s) Oral every 6 hours PRN  morphine  - Injectable 2 milliGRAM(s) IV Push every 6 hours PRN      VITAL SIGNS: Last 24 Hours  T(C): 36.7 (21 Jan 2020 06:36), Max: 38.1 (20 Jan 2020 21:00)  T(F): 98 (21 Jan 2020 06:36), Max: 100.5 (20 Jan 2020 21:00)  HR: 74 (21 Jan 2020 06:36) (74 - 82)  BP: 112/66 (21 Jan 2020 06:36) (112/66 - 135/75)  RR: 18 (21 Jan 2020 06:36) (18 - 18)  SpO2: 98% (21 Jan 2020 08:25) (98% - 98%)    LABS:                        14.0   11.20 )-----------( 247      ( 21 Jan 2020 05:57 )             40.6     01-21    139  |  104  |  12  ----------------------------<  115<H>  3.9   |  23  |  0.9    Ca    9.3      21 Jan 2020 05:57  Mg     1.9     01-21    TPro  7.0  /  Alb  3.9  /  TBili  0.6  /  DBili  x   /  AST  18  /  ALT  27  /  AlkPhos  67  01-21    PT/INR - ( 20 Jan 2020 07:39 )   PT: 12.60 sec;   INR: 1.10 ratio       PTT - ( 20 Jan 2020 07:39 )  PTT:33.6 sec    Culture - Blood (collected 18 Jan 2020 21:24)  Source: .Blood Blood-Venous  Preliminary Report (20 Jan 2020 04:04):    No growth to date.    Culture - Blood (collected 18 Jan 2020 21:24)  Source: .Blood Blood-Venous  Preliminary Report (20 Jan 2020 04:03):    No growth to date.      RADIOLOGY:  VA Duplex Lower Ext Vein Scan, Right (01.19.20 @ 10:05) >  No evidence of deep venous thrombosis or superficial thrombophlebitis in the right lower extremity      ECHO:  none in this admission    PHYSICAL EXAM:    GENERAL: NAD, well-developed, AAOx3  HEENT:  Atraumatic, Normocephalic. EOMI, PERRLA, conjunctiva and sclera clear, No JVD  PULMONARY: Clear to auscultation bilaterally; No wheeze  CARDIOVASCULAR: Regular rate and rhythm; No murmurs, rubs, or gallops  GASTROINTESTINAL: Soft, Nontender, Nondistended; Bowel sounds present  MUSCULOSKELETAL:  2+ Peripheral Pulses, No clubbing, cyanosis, or edema  NEUROLOGY: non-focal  SKIN: No rashes or lesions    ASSESSMENT & PLAN  50y/o M with no PMH presents with  R leg cellulitis.     #R leg cellulitis non purulent s/p excisional debridement by burn 1/20: fat necrosis  - sepsis ruled on admission  -  BCx 1/18: NG, MRSA nares negative  - repeat B cx sent yesterday as he was febrile  - VA duplex -ve DVT  - US of rLE: subq edema , no abcess /fluid collection but fluctuance present on examination  - As per ID , c/w vancomycin , d/c rocephin  - ID: d/c on PO bactrim for MRSA coverage  - fu Wound Cx    #MISC  - DVT ppx: lovenox  - GI ppx: not indicated  - Activity: as tolerated  - Diet: regular  - CODE: FULL  - Disposition: from home  - Pending: wcx, B cx JOVANNY STONER 49y Male  MRN#: [id number]   CODE STATUS: FULL      SUBJECTIVE  Patient is a 49y old Male who presents with a chief complaint of right leg swelling (21 Jan 2020 07:44)    Currently admitted to medicine with the primary diagnosis of RLE cellulitis s/p excisional debridement    Today is hospital day 2d,   OVERNIGHT EVENTS: patient had low grade temp yesterday. B Cx were sent.  Had excisional debridement yesterday elvin rose    Patient feeling much better today.     Urinating and stooling appropriately.    Present Today:           Borden Catheter (x)No/ ()Yes?   Indication:             Central Line (x)No/ ()Yes?   Indication:          IV Fluids (x)No/ ()Yes? Type:  Rate:  Indication:    OBJECTIVE  PAST MEDICAL & SURGICAL HISTORY  No pertinent past medical history  No significant past surgical history    ALLERGIES:  No Known Allergies    HOME MEDICATIONS:  Home Medications:    MEDICATIONS:  STANDING MEDICATIONS  chlorhexidine 4% Liquid 1 Application(s) Topical <User Schedule>  enoxaparin Injectable 40 milliGRAM(s) SubCutaneous daily  vancomycin  IVPB 1000 milliGRAM(s) IV Intermittent every 12 hours    PRN MEDICATIONS  acetaminophen   Tablet .. 650 milliGRAM(s) Oral every 6 hours PRN  morphine  - Injectable 2 milliGRAM(s) IV Push every 6 hours PRN      VITAL SIGNS: Last 24 Hours  T(C): 36.7 (21 Jan 2020 06:36), Max: 38.1 (20 Jan 2020 21:00)  T(F): 98 (21 Jan 2020 06:36), Max: 100.5 (20 Jan 2020 21:00)  HR: 74 (21 Jan 2020 06:36) (74 - 82)  BP: 112/66 (21 Jan 2020 06:36) (112/66 - 135/75)  RR: 18 (21 Jan 2020 06:36) (18 - 18)  SpO2: 98% (21 Jan 2020 08:25) (98% - 98%)    LABS:                        14.0   11.20 )-----------( 247      ( 21 Jan 2020 05:57 )             40.6     01-21    139  |  104  |  12  ----------------------------<  115<H>  3.9   |  23  |  0.9    Ca    9.3      21 Jan 2020 05:57  Mg     1.9     01-21    TPro  7.0  /  Alb  3.9  /  TBili  0.6  /  DBili  x   /  AST  18  /  ALT  27  /  AlkPhos  67  01-21    PT/INR - ( 20 Jan 2020 07:39 )   PT: 12.60 sec;   INR: 1.10 ratio       PTT - ( 20 Jan 2020 07:39 )  PTT:33.6 sec    Culture - Blood (collected 18 Jan 2020 21:24)  Source: .Blood Blood-Venous  Preliminary Report (20 Jan 2020 04:04):    No growth to date.    Culture - Blood (collected 18 Jan 2020 21:24)  Source: .Blood Blood-Venous  Preliminary Report (20 Jan 2020 04:03):    No growth to date.      RADIOLOGY:  VA Duplex Lower Ext Vein Scan, Right (01.19.20 @ 10:05) >  No evidence of deep venous thrombosis or superficial thrombophlebitis in the right lower extremity      ECHO:  none in this admission    PHYSICAL EXAM:    GENERAL: NAD, well-developed, AAOx3  HEENT:  Atraumatic, Normocephalic. EOMI, PERRLA, conjunctiva and sclera clear, No JVD  PULMONARY: Clear to auscultation bilaterally; No wheeze  CARDIOVASCULAR: Regular rate and rhythm; No murmurs, rubs, or gallops  GASTROINTESTINAL: Soft, Nontender, Nondistended; Bowel sounds present  MUSCULOSKELETAL:  2+ Peripheral Pulses, No clubbing, cyanosis, or edema  NEUROLOGY: non-focal  SKIN: No rashes or lesions    ASSESSMENT & PLAN  50y/o M with no PMH presents with  R leg cellulitis.     #R leg cellulitis non purulent s/p excisional debridement by burn 1/20: fat necrosis  - sepsis ruled on admission  -  BCx 1/18: NG, MRSA nares negative  - repeat B cx sent yesterday as he was febrile  - VA duplex -ve DVT  - US of rLE: subq edema , no abcess /fluid collection but fluctuance present on examination  - As per ID , c/w vancomycin , d/c rocephin  - ID: d/c on PO bactrim DS twice a day starting from tomorow for MRSA coverage  - fu Wound Cx    #MISC  - DVT ppx: lovenox  - GI ppx: not indicated  - Activity: as tolerated  - Diet: regular  - CODE: FULL  - Disposition: from home, dc today  - Pending: wcx, B cx

## 2020-01-21 NOTE — DISCHARGE NOTE PROVIDER - CARE PROVIDERS DIRECT ADDRESSES
,DirectAddress_Unknown,DirectAddress_Unknown ,DirectAddress_Unknown,DirectAddress_Unknown,[email or web address]

## 2020-01-21 NOTE — PROGRESS NOTE ADULT - ATTENDING COMMENTS
right lower leg-decreased infection--> improved post debridement    rec: soap and water qd, xeroform gauze, kerlex, ace wrap dressing change qd    f/u thursday 1/23--> pt to call office [phone]

## 2020-01-21 NOTE — DISCHARGE NOTE NURSING/CASE MANAGEMENT/SOCIAL WORK - PATIENT PORTAL LINK FT
You can access the FollowMyHealth Patient Portal offered by [facility] by registering at the following website: [email or web address]. By joining [employer]’s FollowMyHealth portal, you will also be able to view your health information using other applications (apps) compatible with our system.

## 2020-01-21 NOTE — DISCHARGE NOTE PROVIDER - HOSPITAL COURSE
50y/o M with no PMH presents with swelling in right lower leg for 4d. Started with some discomfort on R lateral leg, which persisted then area became erythematous and swollen. Went to PMD Dr. Peck who prescribed keflex 500 q6h which he took for 2d. The erythema/swelling continued to worsen and his ankle/foot became swollen. He also developed some pain in R thigh/groin but no streaking redness along reg. He recalled his PCP who told him to come to ED. Endorses fever, diaphoresis/rigors last night (measured T103 at home). Otherwise denies CP, SOB, palpitations, lightheadness, abd pain, N/V/D/C. No recent flights but works as a .        In ED: T99.1, HR 77, /69, RR20, SpO2 99%. Labs showed WBC 13 w/o neutrophilic predominance, labs otherwise unremarkable. Pt given clinda, doxy and IVF 1L.     He was admitted to medicine for Right leg cellulitis non purulent s/p excisional debridement by burn 1/20: fat necrosis. sepsis ruled on admission. BCx 1/18: NG, MRSA nares negative. VA duplex -ve DVT. US of rLE: subq edema , no abcess /fluid collection but fluctuance present on examination. he was on Iv vancomycin and now switched to PO bactrim . wound cultures are pending.         He will follow up Dr. Peck [phone] within a week. He is medically stable for discharge.        Medical Reconciliation has been reviewed with Medical Attending. All consults cleared for discharge. Discharge instructions discussed and patient aware when to seek medical attention. Patient has proper follow up. All resulted including diagnosis and patient aware they require further follow up. Stressed importance of proper follow up. Medications sent to the pharmacy , checked insurance coverage and changes discussed. All questions and concerns from patient and family addressed. Understanding of instructions verbalized. 50y/o M with no PMH presents with swelling in right lower leg for 4d. Started with some discomfort on R lateral leg, which persisted then area became erythematous and swollen. Went to PMD Dr. Peck who prescribed keflex 500 q6h which he took for 2d. The erythema/swelling continued to worsen and his ankle/foot became swollen. He also developed some pain in R thigh/groin but no streaking redness along reg. He recalled his PCP who told him to come to ED. Endorses fever, diaphoresis/rigors last night (measured T103 at home). Otherwise denies CP, SOB, palpitations, lightheadness, abd pain, N/V/D/C. No recent flights but works as a .        In ED: T99.1, HR 77, /69, RR20, SpO2 99%. Labs showed WBC 13 w/o neutrophilic predominance, labs otherwise unremarkable. Pt given clinda, doxy and IVF 1L.     He was admitted to medicine for Right leg cellulitis non purulent s/p excisional debridement by burn 1/20: fat necrosis. sepsis ruled on admission. BCx 1/18: NG, MRSA nares negative. VA duplex -ve DVT. US of rLE: subq edema , no abcess /fluid collection but fluctuance present on examination. he was on Iv vancomycin and now switched to PO bactrim DS tewice daily for 5 more days . wound cultures are pending.         He will follow up Dr. Peck [phone] within a week. He is medically stable for discharge.        Medical Reconciliation has been reviewed with Medical Attending. All consults cleared for discharge. Discharge instructions discussed and patient aware when to seek medical attention. Patient has proper follow up. All resulted including diagnosis and patient aware they require further follow up. Stressed importance of proper follow up. Medications sent to the pharmacy , checked insurance coverage and changes discussed. All questions and concerns from patient and family addressed. Understanding of instructions verbalized. 48y/o M with no PMH presents with swelling in right lower leg for 4d. Started with some discomfort on R lateral leg, which persisted then area became erythematous and swollen. Went to PMD Dr. Peck who prescribed keflex 500 q6h which he took for 2d. The erythema/swelling continued to worsen and his ankle/foot became swollen. He also developed some pain in R thigh/groin but no streaking redness along reg. He recalled his PCP who told him to come to ED. Endorses fever, diaphoresis/rigors last night (measured T103 at home). Otherwise denies CP, SOB, palpitations, lightheadness, abd pain, N/V/D/C. No recent flights but works as a .        In ED: T99.1, HR 77, /69, RR20, SpO2 99%. Labs showed WBC 13 w/o neutrophilic predominance, labs otherwise unremarkable. Pt given clinda, doxy and IVF 1L.     He was admitted to medicine for Right leg cellulitis non purulent s/p excisional debridement by burn 1/20: fat necrosis. sepsis ruled on admission. BCx 1/18: NG, MRSA nares negative. VA duplex -ve DVT. US of rLE: subq edema , no abcess /fluid collection but fluctuance present on examination. he was on Iv vancomycin and now switched to PO bactrim DS tewice daily for 5 more days . wound cultures are pending. Dressing changed by burn today improved post debridement.        He will follow up Dr. Peck [phone] within a week., Dr Juliette kimball on 1/23. He is medically stable for discharge.        Medical Reconciliation has been reviewed with Medical Attending. All consults cleared for discharge. Discharge instructions discussed and patient aware when to seek medical attention. Patient has proper follow up. All resulted including diagnosis and patient aware they require further follow up. Stressed importance of proper follow up. Medications sent to the pharmacy , checked insurance coverage and changes discussed. All questions and concerns from patient and family addressed. Understanding of instructions verbalized.

## 2020-01-21 NOTE — PROGRESS NOTE ADULT - ASSESSMENT
1. RLE cellulitis S/P I&D by burn attending. On vancomycin. Disposition per ID. May D/C home on Bactrim when cleared by ID, duration per ID. Follow up in office in 1 week with Dr. Peck [phone]

## 2020-01-21 NOTE — DISCHARGE NOTE PROVIDER - PROVIDER TOKENS
FREE:[LAST:[tatum],PHONE:[phone]],FAX:[(   )    -]],PROVIDER:[TOKEN:[[id number]],FOLLOWUP:[1 week]] PROVIDER:[TOKEN:[[id number]],FOLLOWUP:[1 week]],FREE:[LAST:[tatum],PHONE:[phone]],FAX:[(   )    -]],PROVIDER:[TOKEN:[[id number]],FOLLOWUP:[1-3 days]]

## 2020-01-21 NOTE — CHART NOTE - NSCHARTNOTEFT_GEN_A_CORE
<<<RESIDENT DISCHARGE NOTE>>>     JOVANNY STONER  MRN-[id number]    VITAL SIGNS:  T(F): 98 (01-21-20 @ 06:36), Max: 100.5 (01-20-20 @ 21:00)  HR: 74 (01-21-20 @ 06:36)  BP: 112/66 (01-21-20 @ 06:36)  SpO2: 98% (01-21-20 @ 08:25)      PHYSICAL EXAMINATION:  GENERAL: NAD, well-developed, AAOx3  HEENT:  Atraumatic, Normocephalic. EOMI, PERRLA, conjunctiva and sclera clear, No JVD  PULMONARY: Clear to auscultation bilaterally; No wheeze  CARDIOVASCULAR: Regular rate and rhythm; No murmurs, rubs, or gallops  GASTROINTESTINAL: Soft, Nontender, Nondistended; Bowel sounds present  MUSCULOSKELETAL:  2+ Peripheral Pulses, No clubbing, cyanosis  EXTREMITIES: right lower lateral leg- ace wrap+  NEUROLOGY: non-focal  SKIN: No rashes or lesions  TEST RESULTS:                        14.0   11.20 )-----------( 247      ( 21 Jan 2020 05:57 )             40.6       01-21    139  |  104  |  12  ----------------------------<  115<H>  3.9   |  23  |  0.9    Ca    9.3      21 Jan 2020 05:57  Mg     1.9     01-21    TPro  7.0  /  Alb  3.9  /  TBili  0.6  /  DBili  x   /  AST  18  /  ALT  27  /  AlkPhos  67  01-21      FINAL DISCHARGE INTERVIEW:  Resident(s) Present: (Name:__Dr. Ashley___________), RN Present: (Name:  Ajay___________)    DISCHARGE MEDICATION RECONCILIATION  reviewed with Attending (Name: Dr. Smith___________)    DISPOSITION:   [ x ] Home,    [  ] Home with Visiting Nursing Services,   [    ]  SNF/ NH,    [   ] Acute Rehab (4A),   [   ] Other (Specify:_________)

## 2020-01-21 NOTE — DISCHARGE NOTE PROVIDER - CARE PROVIDER_API CALL
rc,   Phone: [phone]  Fax: (   )    -  Follow Up Time:     Susan Guy)  Internal Medicine  [address] [place] [address]  Phone: [phone]  Fax: [phone]  Follow Up Time: 1 week Susan Guy)  Internal Medicine  [address] [place] [address]  Phone: [phone]  Fax: [phone]  Follow Up Time: 1 week    rc,   Phone: [phone]  Fax: (   )    -  Follow Up Time:     Sesar Escobar)  Plastic Surgery  [address] [place] [address]  Phone: [phone]  Fax: [phone]  Follow Up Time: 1-3 days

## 2020-01-21 NOTE — PROGRESS NOTE ADULT - ASSESSMENT
right lower leg-decreased infection--> improved post debridement    rec: soap and water qd, xeroform gauze, kerlex, ace wrap dressing change qd    f/u thursday 1/23--> pt to call office [phone]

## 2020-01-21 NOTE — PROGRESS NOTE ADULT - SUBJECTIVE AND OBJECTIVE BOX
JOVANNY STONER  49y  Male      Patient is a 49y old  Male who presents with a chief complaint of right leg swelling (20 Jan 2020 10:58)      INTERVAL HPI/OVERNIGHT EVENTS: Feeling better, had I&D of RLE       REVIEW OF SYSTEMS:  CONSTITUTIONAL: No fever, weight loss, or fatigue  EYES: No eye pain, visual disturbances, or discharge  ENMT:  No difficulty hearing, tinnitus, vertigo; No sinus or throat pain  NECK: No pain or stiffness  BREASTS: No pain, masses, or nipple discharge  RESPIRATORY: No cough, wheezing, chills or hemoptysis; No shortness of breath  CARDIOVASCULAR: No chest pain, palpitations, dizziness, or leg swelling  GASTROINTESTINAL: No abdominal or epigastric pain. No nausea, vomiting, or hematemesis; No diarrhea or constipation. No melena or hematochezia.  GENITOURINARY: No dysuria, frequency, hematuria, or incontinence  NEUROLOGICAL: No headaches, memory loss, loss of strength, numbness, or tremors  SKIN: No itching, burning, rashes, or lesions   LYMPH NODES: No enlarged glands  ENDOCRINE: No heat or cold intolerance; No hair loss  MUSCULOSKELETAL: No joint pain or swelling; No muscle, back, or extremity pain  PSYCHIATRIC: No depression, anxiety, mood swings, or difficulty sleeping  HEME/LYMPH: No easy bruising, or bleeding gums  ALLERY AND IMMUNOLOGIC: No hives or eczema    T(C): 36.7 (01-21-20 @ 06:36), Max: 38.1 (01-20-20 @ 21:00)  HR: 74 (01-21-20 @ 06:36) (74 - 82)  BP: 112/66 (01-21-20 @ 06:36) (112/66 - 135/75)  RR: 18 (01-21-20 @ 06:36) (18 - 18)  SpO2: --  Wt(kg): --Vital Signs Last 24 Hrs  T(C): 36.7 (21 Jan 2020 06:36), Max: 38.1 (20 Jan 2020 21:00)  T(F): 98 (21 Jan 2020 06:36), Max: 100.5 (20 Jan 2020 21:00)  HR: 74 (21 Jan 2020 06:36) (74 - 82)  BP: 112/66 (21 Jan 2020 06:36) (112/66 - 135/75)  BP(mean): --  RR: 18 (21 Jan 2020 06:36) (18 - 18)  SpO2: --    PHYSICAL EXAM:  GENERAL: NAD, well-groomed, well-developed  HEAD:  Atraumatic, Normocephalic  EYES: EOMI, PERRLA, conjunctiva and sclera clear  ENMT: No tonsillar erythema, exudates, or enlargement; Moist mucous membranes, Good dentition, No lesions  NECK: Supple, No JVD, Normal thyroid  NERVOUS SYSTEM:  Alert & Oriented X3, Good concentration; Motor Strength 5/5 B/L upper and lower extremities; DTRs 2+ intact and symmetric  CHEST/LUNG: Clear to percussion bilaterally; No rales, rhonchi, wheezing, or rubs  HEART: Regular rate and rhythm; No murmurs, rubs, or gallops  ABDOMEN: Soft, Nontender, Nondistended; Bowel sounds present  EXTREMITIES:  2+ Peripheral Pulses, No clubbing, cyanosis, or edema. RLE wound dressed  LYMPH: No lymphadenopathy noted  SKIN: No rashes or lesions    Consultant(s) Notes Reviewed:  [x ] YES  [ ] NO    Discussed with Consultants/Other Providers [ x] YES     LABS                         14.0   11.20 )-----------( 247      ( 21 Jan 2020 05:57 )             40.6   01-21    139  |  104  |  12  ----------------------------<  115<H>  3.9   |  23  |  0.9    Ca    9.3      21 Jan 2020 05:57  Mg     1.9     01-21    TPro  7.0  /  Alb  3.9  /  TBili  0.6  /  DBili  x   /  AST  18  /  ALT  27  /  AlkPhos  67  01-21        RADIOLOGY & ADDITIONAL TESTS:    Imaging Personally Reviewed:  [ ] YES  [ ] NO    HEALTH ISSUES - PROBLEM Dx:  MEDICATIONS  (STANDING):  chlorhexidine 4% Liquid 1 Application(s) Topical <User Schedule>  enoxaparin Injectable 40 milliGRAM(s) SubCutaneous daily  vancomycin  IVPB 1000 milliGRAM(s) IV Intermittent every 12 hours    MEDICATIONS  (PRN):  acetaminophen   Tablet .. 650 milliGRAM(s) Oral every 6 hours PRN Mild Pain (1 - 3)  morphine  - Injectable 2 milliGRAM(s) IV Push every 6 hours PRN Moderate Pain (4 - 6)  Suspected deep vein thrombosis (DVT)

## 2020-01-21 NOTE — PROGRESS NOTE ADULT - SUBJECTIVE AND OBJECTIVE BOX
right lower leg--> dressing change--> open wound post debridement , no purulent drainage, decreased edema and erythema

## 2020-01-21 NOTE — DISCHARGE NOTE PROVIDER - NSDCCPCAREPLAN_GEN_ALL_CORE_FT
PRINCIPAL DISCHARGE DIAGNOSIS  Diagnosis: Cellulitis  Assessment and Plan of Treatment: You came in with features of cellulitis. ID was conculted and you were on IV antibiotics. Burn was consulted and did a bedside debridement which showed fat necrosis. Imaging did not reveal any abcess. You will take PO bactrim for 5 more days.   Take the prescribed antibiotic medicine you are given as directed until it is gone. Take it even if you feel better. It treats the infection and stops it from  Not taking all the medicine can make future infections hard to treat. Keep the infected area clean. When possible, raise the infected area above the level of your heart. This helps keep swelling down. Apply clean bandages as advised. Wash your hands often to prevent spreading the infection. In the future, wash your hands before and after you touch cuts, scratches, or bandages. This will help prevent infection.   Call your doctor or returnt o the emergency room or call 911 immediately if you have any of the following: Difficulty or pain when moving the joints above or below the infected area, Discharge or pus draining from the area, rever of 100.4°F (38°C) or higher, or as directed by your healthcare provider, pain that gets worse in or around the infected site, redness that gets worse in or around the infected area, particularly if the area of redness expands to a wider area, shaking chills, swelling of the infected area, vomiting.  Follow up with Dr. Mcneil within a week PRINCIPAL DISCHARGE DIAGNOSIS  Diagnosis: Cellulitis  Assessment and Plan of Treatment: You came in with features of cellulitis. ID was conculted and you were on IV antibiotics. Burn was consulted and did a bedside debridement which showed fat necrosis. Imaging did not reveal any abcess. You will take PO bactrim for 5 more days.   Take the prescribed antibiotic medicine you are given as directed until it is gone. Take it even if you feel better. It treats the infection and stops it from  Not taking all the medicine can make future infections hard to treat. Keep the infected area clean. When possible, raise the infected area above the level of your heart. This helps keep swelling down. Apply clean bandages as advised. Wash your hands often to prevent spreading the infection. In the future, wash your hands before and after you touch cuts, scratches, or bandages. This will help prevent infection.   Call your doctor or returnt o the emergency room or call 911 immediately if you have any of the following: Difficulty or pain when moving the joints above or below the infected area, Discharge or pus draining from the area, rever of 100.4°F (38°C) or higher, or as directed by your healthcare provider, pain that gets worse in or around the infected site, redness that gets worse in or around the infected area, particularly if the area of redness expands to a wider area, shaking chills, swelling of the infected area, vomiting.  Follow up with Dr. Mcneil within a week and Dr. Escobar on 1/23

## 2020-01-22 PROBLEM — Z78.9 OTHER SPECIFIED HEALTH STATUS: Chronic | Status: ACTIVE | Noted: 2020-01-19

## 2020-01-22 LAB
-  AMPICILLIN/SULBACTAM: SIGNIFICANT CHANGE UP
-  CEFAZOLIN: SIGNIFICANT CHANGE UP
-  CLINDAMYCIN: SIGNIFICANT CHANGE UP
-  DAPTOMYCIN: SIGNIFICANT CHANGE UP
-  ERYTHROMYCIN: SIGNIFICANT CHANGE UP
-  GENTAMICIN: SIGNIFICANT CHANGE UP
-  LINEZOLID: SIGNIFICANT CHANGE UP
-  OXACILLIN: SIGNIFICANT CHANGE UP
-  PENICILLIN: SIGNIFICANT CHANGE UP
-  RIFAMPIN: SIGNIFICANT CHANGE UP
-  TETRACYCLINE: SIGNIFICANT CHANGE UP
-  TRIMETHOPRIM/SULFAMETHOXAZOLE: SIGNIFICANT CHANGE UP
-  VANCOMYCIN: SIGNIFICANT CHANGE UP
CULTURE RESULTS: SIGNIFICANT CHANGE UP
METHOD TYPE: SIGNIFICANT CHANGE UP
ORGANISM # SPEC MICROSCOPIC CNT: SIGNIFICANT CHANGE UP
ORGANISM # SPEC MICROSCOPIC CNT: SIGNIFICANT CHANGE UP
SPECIMEN SOURCE: SIGNIFICANT CHANGE UP

## 2020-01-22 RX ORDER — AZTREONAM 2 G
1 VIAL (EA) INJECTION
Qty: 10 | Refills: 0
Start: 2020-01-22 | End: 2020-01-26

## 2020-01-23 ENCOUNTER — APPOINTMENT (OUTPATIENT)
Dept: BURN CARE | Facility: CLINIC | Age: 50
End: 2020-01-23
Payer: COMMERCIAL

## 2020-01-23 ENCOUNTER — OUTPATIENT (OUTPATIENT)
Dept: OUTPATIENT SERVICES | Facility: HOSPITAL | Age: 50
LOS: 1 days | Discharge: HOME | End: 2020-01-23

## 2020-01-23 DIAGNOSIS — Z98.890 OTHER SPECIFIED POSTPROCEDURAL STATES: ICD-10-CM

## 2020-01-23 DIAGNOSIS — L03.115 CELLULITIS OF RIGHT LOWER LIMB: ICD-10-CM

## 2020-01-23 DIAGNOSIS — Z78.9 OTHER SPECIFIED HEALTH STATUS: ICD-10-CM

## 2020-01-23 PROBLEM — Z00.00 ENCOUNTER FOR PREVENTIVE HEALTH EXAMINATION: Status: ACTIVE | Noted: 2020-01-23

## 2020-01-23 PROCEDURE — 16025 DRESS/DEBRID P-THICK BURN M: CPT

## 2020-01-23 PROCEDURE — 99212 OFFICE O/P EST SF 10 MIN: CPT | Mod: 25

## 2020-01-24 DIAGNOSIS — L98.8 OTHER SPECIFIED DISORDERS OF THE SKIN AND SUBCUTANEOUS TISSUE: ICD-10-CM

## 2020-01-24 DIAGNOSIS — L03.115 CELLULITIS OF RIGHT LOWER LIMB: ICD-10-CM

## 2020-01-24 DIAGNOSIS — I96 GANGRENE, NOT ELSEWHERE CLASSIFIED: ICD-10-CM

## 2020-01-24 LAB
CULTURE RESULTS: SIGNIFICANT CHANGE UP
CULTURE RESULTS: SIGNIFICANT CHANGE UP
SPECIMEN SOURCE: SIGNIFICANT CHANGE UP
SPECIMEN SOURCE: SIGNIFICANT CHANGE UP

## 2020-01-25 LAB
CULTURE RESULTS: SIGNIFICANT CHANGE UP
SPECIMEN SOURCE: SIGNIFICANT CHANGE UP

## 2020-01-26 NOTE — PHYSICAL EXAM
[Healing] : healing [Size%: ______] : Size: [unfilled]% [Infected?] : Infected: Yes [3] : 3 out of 10 [Medium] : medium [Abnormal] : abnormal [de-identified] : right lower leg wound -- healing --> local wound care  [TWNoteComboBox1] : xeroform [] : yes
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
## 2020-01-30 ENCOUNTER — OUTPATIENT (OUTPATIENT)
Dept: OUTPATIENT SERVICES | Facility: HOSPITAL | Age: 50
LOS: 1 days | Discharge: HOME | End: 2020-01-30

## 2020-01-30 ENCOUNTER — APPOINTMENT (OUTPATIENT)
Dept: BURN CARE | Facility: CLINIC | Age: 50
End: 2020-01-30
Payer: COMMERCIAL

## 2020-01-30 PROCEDURE — 16025 DRESS/DEBRID P-THICK BURN M: CPT

## 2020-01-30 PROCEDURE — 99213 OFFICE O/P EST LOW 20 MIN: CPT | Mod: 25
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
## 2020-01-30 NOTE — PHYSICAL EXAM
[Healing] : healing [Size%: ______] : Size: [unfilled]% [Infected?] : Infected: Yes [3] : 3 out of 10 [Abnormal] : abnormal [Medium] : medium [] : yes [de-identified] : right lower leg wound -- healing --> local wound care  [TWNoteComboBox1] : bandaid
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
## 2020-02-06 DIAGNOSIS — L02.415 CUTANEOUS ABSCESS OF RIGHT LOWER LIMB: ICD-10-CM

## 2020-02-20 ENCOUNTER — OUTPATIENT (OUTPATIENT)
Dept: OUTPATIENT SERVICES | Facility: HOSPITAL | Age: 50
LOS: 1 days | Discharge: HOME | End: 2020-02-20

## 2020-02-20 ENCOUNTER — APPOINTMENT (OUTPATIENT)
Dept: BURN CARE | Facility: CLINIC | Age: 50
End: 2020-02-20
Payer: COMMERCIAL
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
## 2020-02-20 DIAGNOSIS — S81.801A UNSPECIFIED OPEN WOUND, RIGHT LOWER LEG, INITIAL ENCOUNTER: ICD-10-CM
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
## 2020-02-20 PROCEDURE — 99213 OFFICE O/P EST LOW 20 MIN: CPT | Mod: 25

## 2020-02-20 PROCEDURE — 16020 DRESS/DEBRID P-THICK BURN S: CPT

## 2020-02-23 LAB
-  AMPICILLIN/SULBACTAM: SIGNIFICANT CHANGE UP
-  CEFAZOLIN: SIGNIFICANT CHANGE UP
-  CLINDAMYCIN: SIGNIFICANT CHANGE UP
-  DAPTOMYCIN: SIGNIFICANT CHANGE UP
-  ERYTHROMYCIN: SIGNIFICANT CHANGE UP
-  GENTAMICIN: SIGNIFICANT CHANGE UP
-  LINEZOLID: SIGNIFICANT CHANGE UP
-  OXACILLIN: SIGNIFICANT CHANGE UP
-  PENICILLIN: SIGNIFICANT CHANGE UP
-  RIFAMPIN: SIGNIFICANT CHANGE UP
-  TETRACYCLINE: SIGNIFICANT CHANGE UP
-  TRIMETHOPRIM/SULFAMETHOXAZOLE: SIGNIFICANT CHANGE UP
-  VANCOMYCIN: SIGNIFICANT CHANGE UP
METHOD TYPE: SIGNIFICANT CHANGE UP

## 2020-02-26 LAB
CULTURE RESULTS: SIGNIFICANT CHANGE UP
ORGANISM # SPEC MICROSCOPIC CNT: SIGNIFICANT CHANGE UP
ORGANISM # SPEC MICROSCOPIC CNT: SIGNIFICANT CHANGE UP
SPECIMEN SOURCE: SIGNIFICANT CHANGE UP

## 2020-03-05 ENCOUNTER — APPOINTMENT (OUTPATIENT)
Dept: BURN CARE | Facility: CLINIC | Age: 50
End: 2020-03-05

## 2020-03-05 RX ORDER — CLINDAMYCIN HYDROCHLORIDE 300 MG/1
300 CAPSULE ORAL EVERY 8 HOURS
Qty: 21 | Refills: 0 | Status: ACTIVE | COMMUNITY
Start: 2020-03-05 | End: 1900-01-01

## 2024-04-25 ENCOUNTER — EMERGENCY (EMERGENCY)
Facility: HOSPITAL | Age: 54
LOS: 0 days | Discharge: HOME | End: 2024-04-25
Attending: EMERGENCY MEDICINE
Payer: COMMERCIAL

## 2024-04-25 VITALS
DIASTOLIC BLOOD PRESSURE: 80 MMHG | HEART RATE: 86 BPM | WEIGHT: 190.04 LBS | OXYGEN SATURATION: 99 % | RESPIRATION RATE: 20 BRPM | SYSTOLIC BLOOD PRESSURE: 155 MMHG | TEMPERATURE: 98 F | HEIGHT: 68 IN

## 2024-04-25 DIAGNOSIS — R21 RASH AND OTHER NONSPECIFIC SKIN ERUPTION: ICD-10-CM

## 2024-04-25 DIAGNOSIS — M25.512 PAIN IN LEFT SHOULDER: ICD-10-CM

## 2024-04-25 DIAGNOSIS — R07.89 OTHER CHEST PAIN: ICD-10-CM

## 2024-04-25 DIAGNOSIS — E78.5 HYPERLIPIDEMIA, UNSPECIFIED: ICD-10-CM

## 2024-04-25 LAB
ALBUMIN SERPL ELPH-MCNC: 4.8 G/DL — SIGNIFICANT CHANGE UP (ref 3.5–5.2)
ALP SERPL-CCNC: 66 U/L — SIGNIFICANT CHANGE UP (ref 30–115)
ALT FLD-CCNC: 31 U/L — SIGNIFICANT CHANGE UP (ref 0–41)
ANION GAP SERPL CALC-SCNC: 11 MMOL/L — SIGNIFICANT CHANGE UP (ref 7–14)
APTT BLD: 35.1 SEC — SIGNIFICANT CHANGE UP (ref 27–39.2)
AST SERPL-CCNC: 24 U/L — SIGNIFICANT CHANGE UP (ref 0–41)
BASOPHILS # BLD AUTO: 0.08 K/UL — SIGNIFICANT CHANGE UP (ref 0–0.2)
BASOPHILS NFR BLD AUTO: 0.8 % — SIGNIFICANT CHANGE UP (ref 0–1)
BILIRUB SERPL-MCNC: 0.5 MG/DL — SIGNIFICANT CHANGE UP (ref 0.2–1.2)
BUN SERPL-MCNC: 10 MG/DL — SIGNIFICANT CHANGE UP (ref 10–20)
CALCIUM SERPL-MCNC: 10.1 MG/DL — SIGNIFICANT CHANGE UP (ref 8.4–10.5)
CHLORIDE SERPL-SCNC: 104 MMOL/L — SIGNIFICANT CHANGE UP (ref 98–110)
CO2 SERPL-SCNC: 25 MMOL/L — SIGNIFICANT CHANGE UP (ref 17–32)
CREAT SERPL-MCNC: 0.9 MG/DL — SIGNIFICANT CHANGE UP (ref 0.7–1.5)
EGFR: 101 ML/MIN/1.73M2 — SIGNIFICANT CHANGE UP
EOSINOPHIL # BLD AUTO: 0.16 K/UL — SIGNIFICANT CHANGE UP (ref 0–0.7)
EOSINOPHIL NFR BLD AUTO: 1.6 % — SIGNIFICANT CHANGE UP (ref 0–8)
GLUCOSE SERPL-MCNC: 117 MG/DL — HIGH (ref 70–99)
HCT VFR BLD CALC: 40.2 % — LOW (ref 42–52)
HGB BLD-MCNC: 14.3 G/DL — SIGNIFICANT CHANGE UP (ref 14–18)
IMM GRANULOCYTES NFR BLD AUTO: 0.3 % — SIGNIFICANT CHANGE UP (ref 0.1–0.3)
INR BLD: 0.99 RATIO — SIGNIFICANT CHANGE UP (ref 0.65–1.3)
LYMPHOCYTES # BLD AUTO: 2.79 K/UL — SIGNIFICANT CHANGE UP (ref 1.2–3.4)
LYMPHOCYTES # BLD AUTO: 28.3 % — SIGNIFICANT CHANGE UP (ref 20.5–51.1)
MAGNESIUM SERPL-MCNC: 1.9 MG/DL — SIGNIFICANT CHANGE UP (ref 1.8–2.4)
MCHC RBC-ENTMCNC: 32 PG — HIGH (ref 27–31)
MCHC RBC-ENTMCNC: 35.6 G/DL — SIGNIFICANT CHANGE UP (ref 32–37)
MCV RBC AUTO: 89.9 FL — SIGNIFICANT CHANGE UP (ref 80–94)
MONOCYTES # BLD AUTO: 0.86 K/UL — HIGH (ref 0.1–0.6)
MONOCYTES NFR BLD AUTO: 8.7 % — SIGNIFICANT CHANGE UP (ref 1.7–9.3)
NEUTROPHILS # BLD AUTO: 5.94 K/UL — SIGNIFICANT CHANGE UP (ref 1.4–6.5)
NEUTROPHILS NFR BLD AUTO: 60.3 % — SIGNIFICANT CHANGE UP (ref 42.2–75.2)
NRBC # BLD: 0 /100 WBCS — SIGNIFICANT CHANGE UP (ref 0–0)
NT-PROBNP SERPL-SCNC: <5 PG/ML — SIGNIFICANT CHANGE UP (ref 0–300)
PLATELET # BLD AUTO: 214 K/UL — SIGNIFICANT CHANGE UP (ref 130–400)
PMV BLD: 11.5 FL — HIGH (ref 7.4–10.4)
POTASSIUM SERPL-MCNC: 4 MMOL/L — SIGNIFICANT CHANGE UP (ref 3.5–5)
POTASSIUM SERPL-SCNC: 4 MMOL/L — SIGNIFICANT CHANGE UP (ref 3.5–5)
PROT SERPL-MCNC: 7.7 G/DL — SIGNIFICANT CHANGE UP (ref 6–8)
PROTHROM AB SERPL-ACNC: 11.3 SEC — SIGNIFICANT CHANGE UP (ref 9.95–12.87)
RBC # BLD: 4.47 M/UL — LOW (ref 4.7–6.1)
RBC # FLD: 12.5 % — SIGNIFICANT CHANGE UP (ref 11.5–14.5)
SODIUM SERPL-SCNC: 140 MMOL/L — SIGNIFICANT CHANGE UP (ref 135–146)
TROPONIN T, HIGH SENSITIVITY RESULT: <6 NG/L — SIGNIFICANT CHANGE UP (ref 6–21)
WBC # BLD: 9.86 K/UL — SIGNIFICANT CHANGE UP (ref 4.8–10.8)
WBC # FLD AUTO: 9.86 K/UL — SIGNIFICANT CHANGE UP (ref 4.8–10.8)

## 2024-04-25 PROCEDURE — 85025 COMPLETE CBC W/AUTO DIFF WBC: CPT

## 2024-04-25 PROCEDURE — 71046 X-RAY EXAM CHEST 2 VIEWS: CPT | Mod: 26

## 2024-04-25 PROCEDURE — 85610 PROTHROMBIN TIME: CPT

## 2024-04-25 PROCEDURE — 93010 ELECTROCARDIOGRAM REPORT: CPT

## 2024-04-25 PROCEDURE — 85730 THROMBOPLASTIN TIME PARTIAL: CPT

## 2024-04-25 PROCEDURE — 83735 ASSAY OF MAGNESIUM: CPT

## 2024-04-25 PROCEDURE — 84484 ASSAY OF TROPONIN QUANT: CPT

## 2024-04-25 PROCEDURE — 80053 COMPREHEN METABOLIC PANEL: CPT

## 2024-04-25 PROCEDURE — 71046 X-RAY EXAM CHEST 2 VIEWS: CPT

## 2024-04-25 PROCEDURE — 99285 EMERGENCY DEPT VISIT HI MDM: CPT | Mod: 25

## 2024-04-25 PROCEDURE — 36415 COLL VENOUS BLD VENIPUNCTURE: CPT

## 2024-04-25 PROCEDURE — 99284 EMERGENCY DEPT VISIT MOD MDM: CPT

## 2024-04-25 PROCEDURE — 93005 ELECTROCARDIOGRAM TRACING: CPT

## 2024-04-25 PROCEDURE — 83880 ASSAY OF NATRIURETIC PEPTIDE: CPT

## 2024-04-25 RX ORDER — VALACYCLOVIR 500 MG/1
1 TABLET, FILM COATED ORAL
Qty: 21 | Refills: 0
Start: 2024-04-25 | End: 2024-05-01

## 2024-04-25 RX ORDER — MUPIROCIN 20 MG/G
1 OINTMENT TOPICAL
Qty: 2 | Refills: 0
Start: 2024-04-25 | End: 2024-05-01

## 2024-04-25 RX ORDER — ACETAMINOPHEN 500 MG
650 TABLET ORAL ONCE
Refills: 0 | Status: COMPLETED | OUTPATIENT
Start: 2024-04-25 | End: 2024-04-25

## 2024-04-25 RX ORDER — IBUPROFEN 200 MG
600 TABLET ORAL ONCE
Refills: 0 | Status: COMPLETED | OUTPATIENT
Start: 2024-04-25 | End: 2024-04-25

## 2024-04-25 RX ADMIN — Medication 600 MILLIGRAM(S): at 19:06

## 2024-04-25 RX ADMIN — Medication 650 MILLIGRAM(S): at 19:06

## 2024-04-25 NOTE — ED PROVIDER NOTE - CHILD ABUSE FACILITY
[facility] Oxybutynin Counseling:  I discussed with the patient the risks of oxybutynin including but not limited to skin rash, drowsiness, dry mouth, difficulty urinating, and blurred vision.

## 2024-04-25 NOTE — ED ADULT NURSE NOTE - CINV DISCH TEACH PARTICIP
At [facility], one important tool we use to improve our patient services is our Patient Survey.  Following your visit you may receive our survey in the mail.    Please take the time to complete the survey.    If your visit with us was great, we want to hear about it.    If we can improve, please let us know how.   Get your Prescription filled at [facility]!    · [facility] uses the same medical record your doctor uses. More accurate and timely information for your doctor and your pharmacy means more effective and safer health care for you and your family.  · [facility] accepts all of the major insurance plans which means you pay the same copay wherever you go.  · [facility] has a prescription savings club with over 200 medications available for $3.99 for a 30 day supply. *$5.00 yearly fee to join the club  · [facility]ists take the time to explain your medication regimen to you.  · [facility] will mail your medications to you free of postage and handling charges. We love homa.          Patient

## 2024-04-25 NOTE — ED PROVIDER NOTE - PHYSICAL EXAMINATION
CONST: Well appearing in NAD  EYES: PERRL, EOMI, Sclera and conjunctiva clear.   ENT: Oropharynx normal appearing, no erythema or exudates. Uvula midline.  CARD: Normal S1 S2; Normal rate and rhythm  RESP: Equal BS B/L, No wheezes, rhonchi or rales. No distress  GI: Soft, non-tender, non-distended.  MS: Normal ROM in all extremities. No midline spinal tenderness.  SKIN: maculopapular rash over the L lower chest   NEURO: A&Ox3, No focal deficits. Strength 5/5 with no sensory deficits. Steady gait

## 2024-04-25 NOTE — ED PROVIDER NOTE - ATTENDING APP SHARED VISIT CONTRIBUTION OF CARE
55 yo pmh of hld presents with cp,   pt denies pain with movement or inspiration.  no sob.  no leg pain or swelling.  no abd pain, no n/v/d.  nonsmoker.  exam: nad, ncat, perrl, eomi, mmm, rrr, ctab, rash on L chest wall, vesicular rash, dermatome t4, abd soft, nt, nd aox3, no calf tenderness, no pitting edema, ttp L trap and L upper chest wall imp: pt rash on L chest wall, ?shingles v. folliculitis, pt says he shaves his chest hair

## 2024-04-25 NOTE — ED ADULT TRIAGE NOTE - NS ED TRIAGE EKG
EKG completed Azithromycin Pregnancy And Lactation Text: This medication is considered safe during pregnancy and is also secreted in breast milk.

## 2024-04-25 NOTE — ED PROVIDER NOTE - CLINICAL SUMMARY MEDICAL DECISION MAKING FREE TEXT BOX
pt rash on L chest wall, ?shingles v. folliculitis, pt says he shaves his chest hair.  will start on mupirocin and valtrex.  Pt was given strict return to ED precautions and pt indicated that he/she understood. Any ordered labs and EKG were reviewed, Dr. Ivette Fortune, attending physician.  Any imaging was ordered and reviewed by me, Dr. Ivette Fortune, attending physician.  Appropriate medications for patient's presenting complaints were ordered and effects were reassessed.  Patient's records, if available,  (prior hospital, ED visit, and/or nursing home notes if available) were reviewed.  Additional history was obtained from EMS, family, and/or PCP (when available).  Escalation to admission/observation was considered.  1) However patient feels much better and is comfortable with discharge.  Appropriate follow-up was arranged.

## 2024-04-25 NOTE — ED PROVIDER NOTE - PATIENT PORTAL LINK FT
You can access the FollowMyHealth Patient Portal offered by [facility] by registering at the following website: [email or web address]. By joining [facility]’s FollowMyHealth portal, you will also be able to view your health information using other applications (apps) compatible with our system.

## 2024-04-25 NOTE — ED PROVIDER NOTE - NSFOLLOWUPINSTRUCTIONS_ED_ALL_ED_FT
Our Emergency Department Referral Coordinators will be reaching out to you in the next 24-48 hours from 9:00am to 5:00pm with a follow up appointment. Please expect a phone call from the hospital in that time frame. If you do not receive a call or if you have any questions or concerns, you can reach them at   [phone]    Chest Pain    Chest pain can be caused by many different conditions which may or may not be dangerous. Causes include heartburn, lung infections, heart attack, blood clot in lungs, skin infections, strain or damage to muscle, cartilage, or bones, etc. Lab tests or other studies including an electrocardiogram (EKG) may have been performed to find the cause of your pain. Make sure to follow up with a cardiologist or as instructed by your health care professional.    SEEK IMMEDIATE MEDICAL CARE IF YOU HAVE THE FOLLOWING SYMPTOMS: worsening chest pain, coughing up blood, unexplained back/neck/jaw pain, severe abdominal pain, dizziness or lightheadedness, shortness of breath, sweaty or clammy skin, vomiting, or racing heart beat. These symptoms may represent a serious problem that is an emergency. Do not wait to see if the symptoms will go away. Get medical help right away. Call your local emergency services (911 in the [place] Do not drive yourself to the hospital.

## 2024-04-25 NOTE — ED ADULT NURSE NOTE - NSFALLUNIVINTERV_ED_ALL_ED
Bed/Stretcher in lowest position, wheels locked, appropriate side rails in place/Call bell, personal items and telephone in reach/Instruct patient to call for assistance before getting out of bed/chair/stretcher/Non-slip footwear applied when patient is off stretcher/[place] to call system/Physically safe environment - no spills, clutter or unnecessary equipment/Purposeful proactive rounding/Room/bathroom lighting operational, light cord in reach

## 2024-04-25 NOTE — ED PROVIDER NOTE - PROGRESS NOTE DETAILS
Results d/w patient and family and copies of results provided.  Pt instructed to return if any worsening symptoms or concerns. Cardiology referral given. They verbalize understanding.

## 2024-04-25 NOTE — ED PROVIDER NOTE - OBJECTIVE STATEMENT
54-year-old male past medical history of hyperlipidemia presents ED complaint of chest pain.  Patient with several days of chest pain that originally and in his back number again to left lower chest and left arm.  No palliating provoking factors, nonexertional, nonpleuritic.  No shortness of breath, nausea, vomiting, diarrhea, fevers, chills, diaphoresis, trauma, history of clots and leg swelling, recent travel, family history of CAD.